# Patient Record
(demographics unavailable — no encounter records)

---

## 2018-07-25 NOTE — NUR
PT BIB PARENT TO ER BED 17. PER REPORT, SYNCOPE AFTER A CHEST OF ICE APPROX 60 
LBS FALL IN TOP OF HIS CHEST. PT WAS OUT FOR APPROX 30 SECS. PT IS AWAKE PTA. 
ACTING AGE APPROPRIATE. EQUAL CHEST RISE. VSS. AWAITING MD GONZÁLES.